# Patient Record
Sex: FEMALE | Race: WHITE | NOT HISPANIC OR LATINO | Employment: OTHER | URBAN - METROPOLITAN AREA
[De-identification: names, ages, dates, MRNs, and addresses within clinical notes are randomized per-mention and may not be internally consistent; named-entity substitution may affect disease eponyms.]

---

## 2017-05-10 ENCOUNTER — OFFICE VISIT (OUTPATIENT)
Dept: MEDICAL GROUP | Facility: PHYSICIAN GROUP | Age: 72
End: 2017-05-10
Payer: MEDICARE

## 2017-05-10 ENCOUNTER — HOSPITAL ENCOUNTER (OUTPATIENT)
Dept: LAB | Facility: MEDICAL CENTER | Age: 72
End: 2017-05-10
Attending: FAMILY MEDICINE
Payer: MEDICARE

## 2017-05-10 VITALS
RESPIRATION RATE: 16 BRPM | WEIGHT: 141 LBS | TEMPERATURE: 98 F | BODY MASS INDEX: 24.98 KG/M2 | HEIGHT: 63 IN | DIASTOLIC BLOOD PRESSURE: 76 MMHG | HEART RATE: 77 BPM | SYSTOLIC BLOOD PRESSURE: 125 MMHG | OXYGEN SATURATION: 95 %

## 2017-05-10 DIAGNOSIS — M06.9 RHEUMATOID ARTHRITIS INVOLVING BOTH HANDS, UNSPECIFIED RHEUMATOID FACTOR PRESENCE: ICD-10-CM

## 2017-05-10 DIAGNOSIS — M06.9 RHEUMATOID ARTHRITIS, INVOLVING UNSPECIFIED SITE, UNSPECIFIED RHEUMATOID FACTOR PRESENCE: ICD-10-CM

## 2017-05-10 LAB
25(OH)D3 SERPL-MCNC: 17 NG/ML (ref 30–100)
ALBUMIN SERPL BCP-MCNC: 3.7 G/DL (ref 3.2–4.9)
ALBUMIN/GLOB SERPL: 0.9 G/DL
ALP SERPL-CCNC: 76 U/L (ref 30–99)
ALT SERPL-CCNC: 11 U/L (ref 2–50)
ANION GAP SERPL CALC-SCNC: 9 MMOL/L (ref 0–11.9)
AST SERPL-CCNC: 15 U/L (ref 12–45)
BILIRUB SERPL-MCNC: 0.5 MG/DL (ref 0.1–1.5)
BUN SERPL-MCNC: 21 MG/DL (ref 8–22)
CALCIUM SERPL-MCNC: 9.3 MG/DL (ref 8.5–10.5)
CHLORIDE SERPL-SCNC: 104 MMOL/L (ref 96–112)
CO2 SERPL-SCNC: 24 MMOL/L (ref 20–33)
CREAT SERPL-MCNC: 0.57 MG/DL (ref 0.5–1.4)
ERYTHROCYTE [DISTWIDTH] IN BLOOD BY AUTOMATED COUNT: 45.1 FL (ref 35.9–50)
ERYTHROCYTE [SEDIMENTATION RATE] IN BLOOD BY WESTERGREN METHOD: 58 MM/HOUR (ref 0–30)
GFR SERPL CREATININE-BSD FRML MDRD: >60 ML/MIN/1.73 M 2
GLOBULIN SER CALC-MCNC: 4.2 G/DL (ref 1.9–3.5)
GLUCOSE SERPL-MCNC: 95 MG/DL (ref 65–99)
HCT VFR BLD AUTO: 41.7 % (ref 37–47)
HGB BLD-MCNC: 13.3 G/DL (ref 12–16)
MCH RBC QN AUTO: 29.8 PG (ref 27–33)
MCHC RBC AUTO-ENTMCNC: 31.9 G/DL (ref 33.6–35)
MCV RBC AUTO: 93.5 FL (ref 81.4–97.8)
PLATELET # BLD AUTO: 296 K/UL (ref 164–446)
PMV BLD AUTO: 10 FL (ref 9–12.9)
POTASSIUM SERPL-SCNC: 4 MMOL/L (ref 3.6–5.5)
PROT SERPL-MCNC: 7.9 G/DL (ref 6–8.2)
RBC # BLD AUTO: 4.46 M/UL (ref 4.2–5.4)
RHEUMATOID FACT SER IA-ACNC: 208 IU/ML (ref 0–14)
SODIUM SERPL-SCNC: 137 MMOL/L (ref 135–145)
T3 SERPL-MCNC: 105 NG/DL (ref 60–181)
T4 FREE SERPL-MCNC: 0.9 NG/DL (ref 0.53–1.43)
TSH SERPL DL<=0.005 MIU/L-ACNC: 2.51 UIU/ML (ref 0.3–3.7)
WBC # BLD AUTO: 5.9 K/UL (ref 4.8–10.8)

## 2017-05-10 PROCEDURE — 82306 VITAMIN D 25 HYDROXY: CPT | Mod: GA

## 2017-05-10 PROCEDURE — 99204 OFFICE O/P NEW MOD 45 MIN: CPT | Performed by: FAMILY MEDICINE

## 2017-05-10 PROCEDURE — 4004F PT TOBACCO SCREEN RCVD TLK: CPT | Mod: 8P | Performed by: FAMILY MEDICINE

## 2017-05-10 PROCEDURE — 1101F PT FALLS ASSESS-DOCD LE1/YR: CPT | Performed by: FAMILY MEDICINE

## 2017-05-10 PROCEDURE — 3017F COLORECTAL CA SCREEN DOC REV: CPT | Mod: 8P | Performed by: FAMILY MEDICINE

## 2017-05-10 PROCEDURE — 36415 COLL VENOUS BLD VENIPUNCTURE: CPT

## 2017-05-10 PROCEDURE — 86200 CCP ANTIBODY: CPT

## 2017-05-10 PROCEDURE — 85652 RBC SED RATE AUTOMATED: CPT

## 2017-05-10 PROCEDURE — 86038 ANTINUCLEAR ANTIBODIES: CPT

## 2017-05-10 PROCEDURE — 86431 RHEUMATOID FACTOR QUANT: CPT

## 2017-05-10 PROCEDURE — 85027 COMPLETE CBC AUTOMATED: CPT

## 2017-05-10 PROCEDURE — 4040F PNEUMOC VAC/ADMIN/RCVD: CPT | Mod: 8P | Performed by: FAMILY MEDICINE

## 2017-05-10 PROCEDURE — 84443 ASSAY THYROID STIM HORMONE: CPT | Mod: GA

## 2017-05-10 PROCEDURE — G8432 DEP SCR NOT DOC, RNG: HCPCS | Performed by: FAMILY MEDICINE

## 2017-05-10 PROCEDURE — 80053 COMPREHEN METABOLIC PANEL: CPT

## 2017-05-10 PROCEDURE — 3014F SCREEN MAMMO DOC REV: CPT | Mod: 8P | Performed by: FAMILY MEDICINE

## 2017-05-10 PROCEDURE — G8420 CALC BMI NORM PARAMETERS: HCPCS | Performed by: FAMILY MEDICINE

## 2017-05-10 PROCEDURE — 84439 ASSAY OF FREE THYROXINE: CPT | Mod: GA

## 2017-05-10 PROCEDURE — 84480 ASSAY TRIIODOTHYRONINE (T3): CPT

## 2017-05-10 RX ORDER — COVID-19 ANTIGEN TEST
KIT MISCELLANEOUS
COMMUNITY

## 2017-05-10 ASSESSMENT — ENCOUNTER SYMPTOMS
CONSTITUTIONAL NEGATIVE: 1
CONSTIPATION: 0
DIZZINESS: 0
HEADACHES: 0
PSYCHIATRIC NEGATIVE: 1
NEUROLOGICAL NEGATIVE: 1
JOINT SWELLING: 1
RESPIRATORY NEGATIVE: 1
MYALGIAS: 0
CHILLS: 0
COUGH: 0
EYES NEGATIVE: 1
PALPITATIONS: 0
CARDIOVASCULAR NEGATIVE: 1
GASTROINTESTINAL NEGATIVE: 1
FEVER: 0
HEMOPTYSIS: 0
NECK PAIN: 0

## 2017-05-10 ASSESSMENT — PATIENT HEALTH QUESTIONNAIRE - PHQ9: CLINICAL INTERPRETATION OF PHQ2 SCORE: 0

## 2017-05-10 NOTE — PROGRESS NOTES
Subjective:      Zia Johnson is a 72 y.o. female who presents with Establish Care; Medication Refill; and Orders Needed            HPI Comments: Long history of RA, has not seen rheum in over a year due to change of insurance plan. Wants refill of methotrexate today that she has been on in the past but ran out about a month ago with worsening of pain in the hands. Pain with use.    1. Rheumatoid arthritis involving both hands, unspecified rheumatoid factor presence (CMS-[facility])      Past Medical History:    COPD                                                          Arthritis                                                   Past Surgical History:    TUBAL COAGULATION LAPAROSCOPIC BILATERAL                       TONSILLECTOMY                                                  Smoking Status: Current Every Day Smoker        Packs/Day: 1.00  Years: 51       Smokeless Status: Never Used                        Alcohol Use: No              History reviewed.  No pertinent family history.      Current outpatient prescriptions: •  methotrexate 2.5 MG Tab, Take 2.5 mg by mouth every 7 days., Disp: , Rfl: •  Naproxen Sodium (ALEVE) 220 MG Cap, Take  by mouth., Disp: , Rfl: •  tocilizumab (ACTEMRA) 200 MG/10ML SOLN, 200 mg by Intravenous route., Disp: , Rfl: •  tramadol (ULTRAM) 50 MG TABS, Take 1 Tab by mouth every 8 hours as needed., Disp: 90 Tab, Rfl: 1        Joint Swelling  This is a chronic problem. The current episode started more than 1 year ago. The problem occurs intermittently. The problem has been waxing and waning. Associated symptoms include joint swelling. Pertinent negatives include no chest pain, chills, coughing, fever, headaches, myalgias, neck pain or rash. The symptoms are aggravated by exertion. Treatments tried: mtx. The treatment provided mild relief.       Review of Systems   Constitutional: Negative.  Negative for fever and chills.        Past Medical History:    COPD                                    "                       Arthritis                                                   Past Surgical History:    TUBAL COAGULATION LAPAROSCOPIC BILATERAL                       TONSILLECTOMY                                                  Smoking Status: Current Every Day Smoker        Packs/Day: 1.00  Years: 51      Smokeless Status: Never Used                        Alcohol Use: No              History reviewed.  No pertinent family history.     HENT: Negative.    Eyes: Negative.    Respiratory: Negative.  Negative for cough and hemoptysis.    Cardiovascular: Negative.  Negative for chest pain and palpitations.   Gastrointestinal: Negative.  Negative for constipation.   Genitourinary: Negative.  Negative for dysuria and urgency.   Musculoskeletal: Positive for joint pain and joint swelling. Negative for myalgias and neck pain.   Skin: Negative.  Negative for rash.   Neurological: Negative.  Negative for dizziness and headaches.   Endo/Heme/Allergies: Negative.    Psychiatric/Behavioral: Negative.  Negative for suicidal ideas.          Objective:     /76 mmHg  Pulse 77  Temp(Src) 36.7 °C (98 °F)  Resp 16  Ht 1.6 m (5' 2.99\")  Wt 63.957 kg (141 lb)  BMI 24.98 kg/m2  SpO2 95%     Physical Exam   Constitutional: She is oriented to person, place, and time. No distress.   HENT:   Head: Normocephalic and atraumatic.   Right Ear: External ear normal.   Left Ear: External ear normal.   Nose: Nose normal.   Mouth/Throat: Oropharynx is clear and moist. No oropharyngeal exudate.   Eyes: Pupils are equal, round, and reactive to light. Right eye exhibits no discharge. Left eye exhibits no discharge. No scleral icterus.   Neck: Normal range of motion. Neck supple. No JVD present. No tracheal deviation present. No thyromegaly present.   Cardiovascular: Normal rate, regular rhythm, normal heart sounds and intact distal pulses.  Exam reveals no gallop and no friction rub.    No murmur heard.  Pulmonary/Chest: Effort normal " and breath sounds normal. No stridor. No respiratory distress. She has no wheezes. She has no rales. She exhibits no tenderness.   Abdominal: Soft. She exhibits no distension. There is no tenderness.   Musculoskeletal:   Swelling of mcp joints in hand with ulnar deviation present   Lymphadenopathy:     She has no cervical adenopathy.   Neurological: She is alert and oriented to person, place, and time.   Skin: Skin is warm and dry. She is not diaphoretic.   Psychiatric: Judgment normal.   Nursing note and vitals reviewed.              Assessment/Plan:     1. Rheumatoid arthritis involving both hands, unspecified rheumatoid factor presence (CMS-[facility])  Will refill meds and get labs and rheum referral

## 2017-05-11 ENCOUNTER — TELEPHONE (OUTPATIENT)
Dept: MEDICAL GROUP | Facility: PHYSICIAN GROUP | Age: 72
End: 2017-05-11

## 2017-05-11 NOTE — TELEPHONE ENCOUNTER
1. Caller Name: patient                                         Call Back Number: [phone] (home)         Patient approves a detailed voicemail message: yes    2. SPECIFIC Action To Be Taken: Referral to Rheumatology    3. Diagnosis/Clinical Reason for Request: Lab results show Rheumatoid arthritis     4. Specialty & Provider Name/Lab/Imaging Location:     5. Is appointment scheduled for requested order/referral: no    Patient informed they will receive a return phone call from the office ONLY if there are any questions before processing their request. Advised to call back if they haven't received a call from the referral department in 5 days.

## 2017-05-11 NOTE — TELEPHONE ENCOUNTER
----- Message from Harjit Pappas M.D. sent at 5/11/2017  9:05 AM PDT -----  Low on vitamin d and needs to replace, labs show rheumatoid arthritis and she needs to see specialist

## 2017-05-12 ENCOUNTER — TELEPHONE (OUTPATIENT)
Dept: MEDICAL GROUP | Facility: PHYSICIAN GROUP | Age: 72
End: 2017-05-12

## 2017-05-12 LAB
CCP IGG SERPL-ACNC: 170 UNITS (ref 0–19)
NUCLEAR IGG SER QL IA: NORMAL

## 2017-05-12 NOTE — TELEPHONE ENCOUNTER
Caller Name: patient                                         Call Back Number: [phone] (home)         Patient approves a detailed voicemail message: yes    2. SPECIFIC Action To Be Taken: Referral to Rheumatology    3. Diagnosis/Clinical Reason for Request: Lab results show Rheumatoid arthritis     4. Specialty & Provider Name/Lab/Imaging Location:     5. Is appointment scheduled for requested order/referral: no    Patient informed they will receive a return phone call from the office ONLY if there are any questions before processing their request. Advised to call back if they haven't received a call from the referral department in 5 days.

## 2017-05-16 ENCOUNTER — TELEPHONE (OUTPATIENT)
Dept: MEDICAL GROUP | Facility: PHYSICIAN GROUP | Age: 72
End: 2017-05-16

## 2017-05-16 NOTE — TELEPHONE ENCOUNTER
Phone Number Called: [phone] (home)       Message: informed pt that she needs to take OTC Vit D and that dr anguiano put in a referral to a specialest    Left Message for patient to call back: no